# Patient Record
Sex: FEMALE | Race: BLACK OR AFRICAN AMERICAN
[De-identification: names, ages, dates, MRNs, and addresses within clinical notes are randomized per-mention and may not be internally consistent; named-entity substitution may affect disease eponyms.]

---

## 2018-04-24 ENCOUNTER — HOSPITAL ENCOUNTER (EMERGENCY)
Dept: HOSPITAL 63 - ER | Age: 38
LOS: 1 days | Discharge: HOME | End: 2018-04-25
Payer: SELF-PAY

## 2018-04-24 VITALS — BODY MASS INDEX: 45.99 KG/M2 | WEIGHT: 293 LBS | HEIGHT: 67 IN

## 2018-04-24 DIAGNOSIS — G43.909: Primary | ICD-10-CM

## 2018-04-24 DIAGNOSIS — M25.512: ICD-10-CM

## 2018-04-24 LAB
BASOPHILS # BLD AUTO: 0.1 X10^3/UL (ref 0–0.2)
BASOPHILS NFR BLD: 1 % (ref 0–3)
EOSINOPHIL NFR BLD: 0.4 X10^3/UL (ref 0–0.7)
EOSINOPHIL NFR BLD: 5 % (ref 0–3)
ERYTHROCYTE [DISTWIDTH] IN BLOOD BY AUTOMATED COUNT: 18.8 % (ref 11.5–14.5)
HCT VFR BLD CALC: 34.9 % (ref 36–47)
HGB BLD-MCNC: 11 G/DL (ref 12–15.5)
LYMPHOCYTES # BLD: 2.8 X10^3/UL (ref 1–4.8)
LYMPHOCYTES NFR BLD AUTO: 31 % (ref 24–48)
MCH RBC QN AUTO: 23 PG (ref 25–35)
MCHC RBC AUTO-ENTMCNC: 31 G/DL (ref 31–37)
MCV RBC AUTO: 73 FL (ref 79–100)
MONO #: 0.4 X10^3/UL (ref 0–1.1)
MONOCYTES NFR BLD: 5 % (ref 0–9)
NEUT #: 5.3 X10^3UL (ref 1.8–7.7)
NEUTROPHILS NFR BLD AUTO: 59 % (ref 31–73)
PLATELET # BLD AUTO: 253 X10^3/UL (ref 140–400)
RBC # BLD AUTO: 4.78 X10^6/UL (ref 3.5–5.4)
WBC # BLD AUTO: 9.1 X10^3/UL (ref 4–11)

## 2018-04-24 PROCEDURE — 96375 TX/PRO/DX INJ NEW DRUG ADDON: CPT

## 2018-04-24 PROCEDURE — 85025 COMPLETE CBC W/AUTO DIFF WBC: CPT

## 2018-04-24 PROCEDURE — 99285 EMERGENCY DEPT VISIT HI MDM: CPT

## 2018-04-24 PROCEDURE — 84484 ASSAY OF TROPONIN QUANT: CPT

## 2018-04-24 PROCEDURE — 93005 ELECTROCARDIOGRAM TRACING: CPT

## 2018-04-24 PROCEDURE — 36415 COLL VENOUS BLD VENIPUNCTURE: CPT

## 2018-04-24 PROCEDURE — 96374 THER/PROPH/DIAG INJ IV PUSH: CPT

## 2018-04-24 PROCEDURE — 80048 BASIC METABOLIC PNL TOTAL CA: CPT

## 2018-04-24 PROCEDURE — 71045 X-RAY EXAM CHEST 1 VIEW: CPT

## 2018-04-24 NOTE — PHYS DOC
Past History


Past Medical History:  Migraines


Past Surgical History:  Cholecystectomy


Alcohol Use:  Occasionally


Drug Use:  None





Adult General


Chief Complaint


Chief Complaint:  HEADACHE





HPI


HPI





Patient is a 38 year old F who presents with a migraine headache. Patient 

states she's had a migraine headache for the past day however tonight the pain 

got worse and she is having some sharp shooting pains down to her left 

shoulder. Patient denies any shortness of breath or chest pain. Patient states 

she's had no previous heart attack, stents, bypass surgery. Patient states she 

does not take any medications at this time. Patient does have a smoking 

history. Patient denies any history of DVTs or PEs. Patient denies any fevers. 

Patient states this headache came on gradual as her typical migraines stay. 

Patient has no other complaints.





Review of Systems


Review of Systems


GEN: Headache


HEENT: Denies blurred vision, sore throat


CV: Denies chest pain


RESP: Denies shortness of air, cough


GI: Denies n/v/d


NEURO: Denies confusion, dizziness


MSK: Left shoulder.





All other systems were reviewed and found to be within normal limits, except as 

documented in this note.





Allergies


Allergies





Allergies








Coded Allergies Type Severity Reaction Last Updated Verified


 


  No Known Drug Allergies    7/23/15 No











Physical Exam


Physical Exam


GEN.:    Mild distress.  Alert and oriented.


HEENT:    Head is normocephalic, atraumatic, pupils were equal and reactive to 

light, except ocular muscles are intact bilaterally


NECK:    Supple.  


LUNGS:    CTAB.


HEART:    RRR, S1, S2 present.  Peripheral pulses intact


ABDOMEN:    Soft, nontender.  Positive bowel sounds.


EXTREMITIES:    Without any cyanosis.    


NEUROLOGIC:     Normal speech, normal tone, cranial nerves II through XII are 

grossly intact without any focal neurological deficits


PSYCHIATRIC:    Normal affect, normal mood.


SKIN:   No ulcerations





Current Patient Data


Vital Signs





 Vital Signs








  Date Time  Temp Pulse Resp B/P (MAP) Pulse Ox O2 Delivery O2 Flow Rate FiO2


 


4/24/18 23:06 99.0 125 18  98 Room Air  











EKG


EKG


2332: EKG shows sinus tach rate of 110 no STEMI[]





Radiology/Procedures


Radiology/Procedures


Chest x-ray NAD[]





Course & Med Decision Making


Course & Med Decision Making


Pertinent Labs and Imaging studies reviewed. (See chart for details)





ED course:


Patient was seen and examined in the emergency room cardiac workup was ordered 

along with medications for her migraine headache which included, 2 g of 

magnesium, 10 mg of Decadron, 50 mg of Benadryl, 30 mg of Toradol, 1 L normal 

saline, 10 mg of Reglan


0051: I updated the patient on lab results, chest x-ray findings and 

reevaluated the patient in which she states she feels much better. Patient 

states her headache is improved since she got here and her left shoulder pain 

has improved. Patient's heart rates in the 90s now. Patient states she would 

like to go home. I recommended she follow up with a PCP for further evaluation 

and management.





MDM:


After reviewing the chart, CC/HPI/PMH, physical exam, [lab results], [

radiological results], I do not believe the patient is having acute MI (HEART=1)

, PE (Wells =0), and low suspicion for acute thoracic aortic dissection. I do 

not believe the patient has severe intracranial process warranting a CT scan or 

MRI. I believe the patient's migraine headache is consistent with her previous 

diagnosis of migraines. On reexamination patient's feeling much better and like 

to go home. I recommended short-term follow-up with her PCP next one to 2 days. 

Additional verbal discharge instructions were provided to the patient and that 

if symptoms get worse or any new symptoms arise that are worrisome to the 

patient she is to return to the emergency room immediately


 





[]





Dragon Disclaimer


Dragon Disclaimer


This electronic medical record was generated, in whole or in part, using a 

voice recognition dictation system.





Departure


Departure:


Impression:  


 Primary Impression:  


 Migraine headache


 Additional Impression:  


 Left shoulder pain


Disposition:  01 HOME, SELF-CARE


Condition:  IMPROVED


Referrals:  


SANDY ALARCON (PCP)


Patient Instructions:  Migraine Headache





Additional Instructions:  


Please follow-up with your family doctor in the next one to 2 days and return 

if symptoms increase





Problem Qualifiers











REMIGIO SOMERS DO Apr 24, 2018 23:28

## 2018-04-25 VITALS
DIASTOLIC BLOOD PRESSURE: 79 MMHG | SYSTOLIC BLOOD PRESSURE: 105 MMHG | SYSTOLIC BLOOD PRESSURE: 105 MMHG | DIASTOLIC BLOOD PRESSURE: 79 MMHG

## 2018-04-25 LAB
ANION GAP SERPL CALC-SCNC: 10 MMOL/L (ref 6–14)
ANISOCYTOSIS BLD QL SMEAR: SLIGHT
CA-I SERPL ISE-MCNC: 10 MG/DL (ref 7–20)
CALCIUM SERPL-MCNC: 8.7 MG/DL (ref 8.5–10.1)
CHLORIDE SERPL-SCNC: 105 MMOL/L (ref 98–107)
CO2 SERPL-SCNC: 25 MMOL/L (ref 21–32)
CREAT SERPL-MCNC: 0.8 MG/DL (ref 0.6–1)
DACRYOCYTES BLD QL SMEAR: (no result)
GFR SERPLBLD BASED ON 1.73 SQ M-ARVRAT: 97.1 ML/MIN
GLUCOSE SERPL-MCNC: 114 MG/DL (ref 70–99)
HYPOCHROMIA BLD QL SMEAR: SLIGHT
MICROCYTES BLD QL SMEAR: SLIGHT
OVALOCYTES BLD QL SMEAR: (no result)
PLATELET # BLD EST: ADEQUATE 10*3/UL
POLYCHROMASIA BLD QL SMEAR: SLIGHT
POTASSIUM SERPL-SCNC: 3.6 MMOL/L (ref 3.5–5.1)
SODIUM SERPL-SCNC: 140 MMOL/L (ref 136–145)

## 2018-04-25 NOTE — RAD
Portable chest, 4/24/2018:



History: Chest pain



Comparison is made to a study from 12/20/2015.  The heart size and pulmonary

vascularity are normal.  No pulmonary infiltrates are seen.  There is no

evidence of pleural fluid.



IMPRESSION: No acute cardiopulmonary abnormality is detected.

## 2018-10-09 ENCOUNTER — HOSPITAL ENCOUNTER (OUTPATIENT)
Dept: HOSPITAL 63 - MAMMO | Age: 38
Discharge: HOME | End: 2018-10-09
Payer: COMMERCIAL

## 2018-10-09 DIAGNOSIS — Z12.31: Primary | ICD-10-CM

## 2018-10-09 PROCEDURE — 77067 SCR MAMMO BI INCL CAD: CPT

## 2018-10-09 NOTE — RAD
DATE: 10/9/2018



EXAM: DIGITAL SCREEN BILAT W/CAD



HISTORY: Routine screening



COMPARISON: Baseline study



This study was interpreted with the benefit of Computerized Aided Detection

(CAD).





Breast Density: SCATTERED The breast parenchyma shows scattered fibroglandular

densities. Breast parenchyma level B.





FINDINGS: No suspicious breast densities seen.  There are scattered benign

type calcifications.  No suspicious microcalcifications are evident.





IMPRESSION: There is no mammographic evidence of malignancy in either breast.







BI-RADS CATEGORY: 2 BENIGN FINDING(S)



RECOMMENDED FOLLOW-UP: 12M 12 MONTH FOLLOW-UP



PQRS compliance statement: Patient information was entered into a reminder

system with a target due date     for the next mammogram.



Mammography is a sensitive method for finding small breast cancers, but it

does not detect them all and is not a substitute for careful clinical

examination.  A negative mammogram does not negate a clinically suspicious

finding and should not result in delay in biopsying a clinically suspicious

abnormality.



"Our facility is accredited by the [employer] Mammography

Program."

## 2020-07-03 ENCOUNTER — HOSPITAL ENCOUNTER (EMERGENCY)
Dept: HOSPITAL 63 - ER | Age: 40
Discharge: ANOTHER HOSPITAL | End: 2020-07-03
Payer: SELF-PAY

## 2020-07-03 VITALS
SYSTOLIC BLOOD PRESSURE: 142 MMHG | SYSTOLIC BLOOD PRESSURE: 142 MMHG | DIASTOLIC BLOOD PRESSURE: 95 MMHG | DIASTOLIC BLOOD PRESSURE: 95 MMHG | DIASTOLIC BLOOD PRESSURE: 95 MMHG | SYSTOLIC BLOOD PRESSURE: 142 MMHG

## 2020-07-03 VITALS — HEIGHT: 67 IN | BODY MASS INDEX: 45.99 KG/M2 | WEIGHT: 293 LBS

## 2020-07-03 DIAGNOSIS — K63.1: ICD-10-CM

## 2020-07-03 DIAGNOSIS — G43.909: ICD-10-CM

## 2020-07-03 DIAGNOSIS — I10: ICD-10-CM

## 2020-07-03 DIAGNOSIS — K57.92: Primary | ICD-10-CM

## 2020-07-03 DIAGNOSIS — E11.9: ICD-10-CM

## 2020-07-03 DIAGNOSIS — Z87.891: ICD-10-CM

## 2020-07-03 DIAGNOSIS — D72.829: ICD-10-CM

## 2020-07-03 DIAGNOSIS — E66.9: ICD-10-CM

## 2020-07-03 DIAGNOSIS — Z98.890: ICD-10-CM

## 2020-07-03 DIAGNOSIS — Z90.49: ICD-10-CM

## 2020-07-03 LAB
% LYMPHS: 5 % (ref 24–48)
% MONOS: 5 % (ref 0–10)
% SEGS: 89 % (ref 35–66)
ALBUMIN SERPL-MCNC: 2.7 G/DL (ref 3.4–5)
ALBUMIN/GLOB SERPL: 0.5 {RATIO} (ref 1–1.7)
ALP SERPL-CCNC: 95 U/L (ref 46–116)
ALT SERPL-CCNC: 24 U/L (ref 14–59)
ANION GAP SERPL CALC-SCNC: 11 MMOL/L (ref 6–14)
APTT PPP: (no result) S
AST SERPL-CCNC: 23 U/L (ref 15–37)
BACTERIA #/AREA URNS HPF: (no result) /HPF
BASOPHILS # BLD AUTO: 0.1 X10^3/UL (ref 0–0.2)
BASOPHILS NFR BLD: 1 % (ref 0–3)
BILIRUB SERPL-MCNC: 0.8 MG/DL (ref 0.2–1)
BILIRUB UR QL STRIP: (no result)
BUN/CREAT SERPL: 10 (ref 6–20)
CA-I SERPL ISE-MCNC: 10 MG/DL (ref 7–20)
CALCIUM SERPL-MCNC: 9.1 MG/DL (ref 8.5–10.1)
CHLORIDE SERPL-SCNC: 99 MMOL/L (ref 98–107)
CO2 SERPL-SCNC: 26 MMOL/L (ref 21–32)
CREAT SERPL-MCNC: 1 MG/DL (ref 0.6–1)
EOSINOPHIL NFR BLD AUTO: 1 % (ref 0–5)
EOSINOPHIL NFR BLD: 0 % (ref 0–3)
EOSINOPHIL NFR BLD: 0.1 X10^3/UL (ref 0–0.7)
ERYTHROCYTE [DISTWIDTH] IN BLOOD BY AUTOMATED COUNT: 14.8 % (ref 11.5–14.5)
FIBRINOGEN PPP-MCNC: (no result) MG/DL
GFR SERPLBLD BASED ON 1.73 SQ M-ARVRAT: 74.3 ML/MIN
GLOBULIN SER-MCNC: 5.5 G/DL (ref 2.2–3.8)
GLUCOSE SERPL-MCNC: 116 MG/DL (ref 70–99)
GLUCOSE UR STRIP-MCNC: (no result) MG/DL
HCT VFR BLD CALC: 38.9 % (ref 36–47)
HGB BLD-MCNC: 12.5 G/DL (ref 12–15.5)
LIPASE: 45 U/L (ref 73–393)
LYMPHOCYTES # BLD: 1.8 X10^3/UL (ref 1–4.8)
LYMPHOCYTES NFR BLD AUTO: 11 % (ref 24–48)
MCH RBC QN AUTO: 27 PG (ref 25–35)
MCHC RBC AUTO-ENTMCNC: 32 G/DL (ref 31–37)
MCV RBC AUTO: 82 FL (ref 79–100)
MONO #: 0.6 X10^3/UL (ref 0–1.1)
MONOCYTES NFR BLD: 4 % (ref 0–9)
NEUT #: 13.2 X10^3UL (ref 1.8–7.7)
NEUTROPHILS NFR BLD AUTO: 84 % (ref 31–73)
NITRITE UR QL STRIP: (no result)
PLATELET # BLD AUTO: 254 X10^3/UL (ref 140–400)
PLATELET # BLD EST: ADEQUATE 10*3/UL
POTASSIUM SERPL-SCNC: 3.5 MMOL/L (ref 3.5–5.1)
PREG TEST PT QUAL: NEGATIVE
PROT SERPL-MCNC: 8.2 G/DL (ref 6.4–8.2)
RBC # BLD AUTO: 4.73 X10^6/UL (ref 3.5–5.4)
RBC #/AREA URNS HPF: (no result) /HPF (ref 0–2)
SODIUM SERPL-SCNC: 136 MMOL/L (ref 136–145)
SP GR UR STRIP: (no result)
SQUAMOUS #/AREA URNS LPF: (no result) /LPF
UROBILINOGEN UR-MCNC: (no result) MG/DL
WBC # BLD AUTO: 15.8 X10^3/UL (ref 4–11)
WBC #/AREA URNS HPF: (no result) /HPF (ref 0–4)

## 2020-07-03 PROCEDURE — 81001 URINALYSIS AUTO W/SCOPE: CPT

## 2020-07-03 PROCEDURE — 86901 BLOOD TYPING SEROLOGIC RH(D): CPT

## 2020-07-03 PROCEDURE — 96375 TX/PRO/DX INJ NEW DRUG ADDON: CPT

## 2020-07-03 PROCEDURE — 96365 THER/PROPH/DIAG IV INF INIT: CPT

## 2020-07-03 PROCEDURE — 74177 CT ABD & PELVIS W/CONTRAST: CPT

## 2020-07-03 PROCEDURE — 85007 BL SMEAR W/DIFF WBC COUNT: CPT

## 2020-07-03 PROCEDURE — 36415 COLL VENOUS BLD VENIPUNCTURE: CPT

## 2020-07-03 PROCEDURE — 86850 RBC ANTIBODY SCREEN: CPT

## 2020-07-03 PROCEDURE — 99285 EMERGENCY DEPT VISIT HI MDM: CPT

## 2020-07-03 PROCEDURE — 84703 CHORIONIC GONADOTROPIN ASSAY: CPT

## 2020-07-03 PROCEDURE — 86900 BLOOD TYPING SEROLOGIC ABO: CPT

## 2020-07-03 PROCEDURE — 85025 COMPLETE CBC W/AUTO DIFF WBC: CPT

## 2020-07-03 PROCEDURE — 83690 ASSAY OF LIPASE: CPT

## 2020-07-03 PROCEDURE — 80053 COMPREHEN METABOLIC PANEL: CPT

## 2020-07-03 NOTE — PHYS DOC
Past History


Past Medical History:  Diabetes, Hypertension, Migraines


Past Medical History


obesity


Past Surgical History:  Cholecystectomy, 


Smoking:  Quit Greater Than 1 Year


Alcohol Use:  Occasionally


Drug Use:  None





General Adult


EDM:


Chief Complaint:  ABDOMINAL PAIN





HPI:


HPI:





Patient is a 40 year old female who presents for evaluation of mid and upper 

abdominal discomfort.  Patient has been having dark and bloody stools over the 

past 2 days.  Patient states that the blood is present when she wipes after a 

bowel movement.  She states she is dizzy when she tries to stand or walk.  

Patient denies any urinary or vaginal complaints.  Patient is not on a blood thi

nner.  Patient denies other physical complaints





Review of Systems:


Review of Systems:


Constitutional:  Denies fever or chills 


Eyes:  Denies change in visual acuity 


HENT:  Denies nasal congestion or sore throat 


Respiratory:  Denies cough or shortness of breath 


Cardiovascular:  Denies chest pain or edema 


GI:  Mid and upper abdominal pain with nausea, no vomiting, some bloody stools 

no diarrhea 


: Denies dysuria 


Musculoskeletal:  Denies back pain or joint pain 


Integument:  Denies rash 


Neurologic:  Denies headache, focal weakness or sensory changes 


Endocrine:  Denies polyuria or polydipsia 


Lymphatic:  Denies swollen glands 


Psychiatric:  Denies depression or anxiety





Heart Score:


Risk Factors:


Risk Factors:  DM, Current or recent (<one month) smoker, HTN, HLP, family 

history of CAD, obesity.


Risk Scores:


Score 0 - 3:  2.5% MACE over next 6 weeks - Discharge Home


Score 4 - 6:  20.3% MACE over next 6 weeks - Admit for Clinical Observation


Score 7 - 10:  72.7% MACE over next 6 weeks - Early Invasive Strategies





Current Medications:


Current Meds:





Current Medications








 Medications


  (Trade)  Dose


 Ordered  Sig/Loki  Start Time


 Stop Time Status Last Admin


Dose Admin


 


 Famotidine


  (Pepcid Vial)  20 mg  1X  ONCE  7/3/20 11:15


 7/3/20 11:16 UNV  





 


 Ondansetron HCl


  (Zofran)  4 mg  1X  ONCE  7/3/20 11:15


 7/3/20 11:16 UNV  














Allergies:


Allergies:





Allergies








Coded Allergies Type Severity Reaction Last Updated Verified


 


  No Known Drug Allergies    7/23/15 No











Physical Exam:


PE:





Constitutional: Well developed, well nourished, mild acute distress, non-toxic 

appearance. []


HENT: Normocephalic, atraumatic, bilateral external ears normal, oropharynx 

moist, no oral exudates, nose normal. []


Eyes: PERRL, EOMI, conjunctiva normal, no discharge. [] 


Neck: Normal range of motion, no tenderness, supple. [] 


Cardiovascular:Heart rate regular rhythm, no murmur []


Lungs & Thorax:  Bilateral breath sounds clear to auscultation []


Abdomen: Bowel sounds diminished, soft, mild mid abdomen tenderness, no masses, 

no guarding or rebound. [] 


Skin: Warm, dry, no erythema, no rash. [] 


Back: No tenderness. [] 


Extremities: No tenderness, no cyanosis, ROM intact, no edema. [] 


Neurologic: Alert and oriented, normal motor function, normal sensory function, 

no focal deficits noted. []


Psychologic: Affect normal, judgement normal, mood normal. []





Current Patient Data:


Labs:


UA: bloody results








Laboratory Tests








Test


 7/3/20


11:20


 


White Blood Count 15.8 x10^3/uL 


 


Red Blood Count 4.73 x10^6/uL 


 


Hemoglobin 12.5 g/dL 


 


Hematocrit 38.9 % 


 


Mean Corpuscular Volume 82 fL 


 


Mean Corpuscular Hemoglobin 27 pg 


 


Mean Corpuscular Hemoglobin


Concent 32 g/dL 





 


Red Cell Distribution Width 14.8 % 


 


Platelet Count 254 x10^3/uL 


 


Neutrophils (%) (Auto) 84 % 


 


Lymphocytes (%) (Auto) 11 % 


 


Monocytes (%) (Auto) 4 % 


 


Eosinophils (%) (Auto) 0 % 


 


Basophils (%) (Auto) 1 % 


 


Neutrophils # (Auto) 13.2 x10^3uL 


 


Lymphocytes # (Auto) 1.8 x10^3/uL 


 


Monocytes # (Auto) 0.6 x10^3/uL 


 


Eosinophils # (Auto) 0.1 x10^3/uL 


 


Basophils # (Auto) 0.1 x10^3/uL 


 


Segmented Neutrophils % 89 % 


 


Lymphocytes % 5 % 


 


Monocytes % 5 % 


 


Eosinophils % 1 % 


 


Platelet Estimate Adequate 


 


Large Platelets Occ 


 


Sodium Level 136 mmol/L 


 


Potassium Level 3.5 mmol/L 


 


Chloride Level 99 mmol/L 


 


Carbon Dioxide Level 26 mmol/L 


 


Anion Gap 11 


 


Blood Urea Nitrogen 10 mg/dL 


 


Creatinine 1.0 mg/dL 


 


Estimated GFR


(Cockcroft-Gault) 74.3 





 


BUN/Creatinine Ratio 10 


 


Glucose Level 116 mg/dL 


 


Calcium Level 9.1 mg/dL 


 


Total Bilirubin 0.8 mg/dL 


 


Aspartate Amino Transf


(AST/SGOT) 23 U/L 





 


Alanine Aminotransferase


(ALT/SGPT) 24 U/L 





 


Alkaline Phosphatase 95 U/L 


 


Total Protein 8.2 g/dL 


 


Albumin 2.7 g/dL 


 


Albumin/Globulin Ratio 0.5 


 


Lipase 45 U/L 


 


Serum Pregnancy Test,


Qualitative Negative 











Current Medications








 Medications


  (Trade)  Dose


 Ordered  Sig/Loki


 Route


 PRN Reason  Start Time


 Stop Time Status Last Admin


Dose Admin


 


 Ondansetron HCl


  (Zofran)  4 mg  1X  ONCE


 IVP


   7/3/20 11:30


 7/3/20 11:31 DC 7/3/20 11:46





 


 Famotidine


  (Pepcid Vial)  20 mg  1X  ONCE


 IVP


   7/3/20 11:15


 7/3/20 11:24 DC 7/3/20 11:46





 


 Iohexol


  (Omnipaque 240


 Mg/ml)  50 ml  STK-MED ONCE


 .ROUTE


   7/3/20 11:54


 7/3/20 11:54 DC  





 


 Iohexol


  (Omnipaque 240


 Mg/ml)  30 ml  1X  ONCE


 PO


   7/3/20 12:15


 7/3/20 12:16 DC  





 


 Iohexol


  (Omnipaque 300


 Mg/ml)  75 ml  1X  ONCE


 IV


   7/3/20 12:15


 7/3/20 12:16 DC  





 


 Fentanyl Citrate


  (Fentanyl 2ml


 Vial)  50 mcg  1X  ONCE


 IVP


   7/3/20 12:30


 7/3/20 12:31   











Vital Signs:





                                   Vital Signs








  Date Time  Temp Pulse Resp B/P (MAP) Pulse Ox O2 Delivery O2 Flow Rate FiO2


 


7/3/20 11:20 98.1 128 16 125/74 (91) 93 Room Air  











EKG:


EKG:


[]





Radiology/Procedures:


Radiology/Procedures:


[facility] [address], [place] [address] [phone]


                                        


                                 IMAGING REPORT





                                     Signed





PATIENT: SHEREE JONES  ACCOUNT: [id number]     MRN#: [id number]


: 1980           LOCATION: ER              AGE: 40


SEX: F                    EXAM DT: 20         ACCESSION#: [id number]


STATUS: REG ER            ORD. PHYSICIAN: JOSE MONTOYA DO


REASON: abd pain, dark stools


PROCEDURE: CT ABD PELV W/ORAL&IV CONTRAST





 


CT SCAN OF THE ABDOMEN AND PELVIS WITH IV CONTRAST.


 


History:  Reason: abd pain, dark stools / Spl. Instructions: DRINKING 12-1


/ History: 


 


Comparison:None.


 


Procedure: 


Contiguous axial images of the abdomen and pelvis were performed  after 


the administration of 75 cc of Isovue 370 IV contrast.


 


Oral contrast: Yes.


 


Findings:


 


Linear opacities in right lung base are likely discoid atelectasis.


 


The appendix is normal.


 


There is an IUD in the uterus which appears well seated. There is moderate


wall thickening of a 12 cm length of the proximal and mid sigmoid colon 


with surrounding inflammation and diverticula with multiple foci of air 


projecting outside of the lumen but no walled off abscess.


 


Liver: Unremarkable


Spleen: Unremarkable


Pancreas: Unremarkable


Adrenal Glands: Unremarkable


Kidneys: Unremarkable


 


There is no mass or lymphadenopathy.


 


There is no free air.  


 


There is no free fluid.


 


The urinary bladder is mostly collapsed.


 


Impression:


 


Diverticulitis with a walled off microperforation. There is no free fluid 


or free air or abscess.


 


End impression


 


PQRS Compliance Statement:


 


One or more of the following individualized dose reduction techniques were


utilized for this examination:  


1. Automated exposure control  


2. Adjustment of the mA and/or kV according to patient size  


3. Use of iterative reconstruction technique


 


Electronically signed by: Noeim Thompson III, MD (7/3/2020 1:56 PM) [id number]














DICTATED AND SIGNED BY:     NOEMI THOMPSON III, MD


DATE:     20 1352





CC: JOSE MONTOYA DO; DB MALIK MD ~


[]





Course & Med Decision Making:


Course & Med Decision Making


Pertinent Labs and Imaging studies reviewed. (See chart for details)





[]





Dragon Disclaimer:


Dragon Disclaimer:


This electronic medical record was generated, in whole or in part, using a voice

 recognition dictation system.





1405 stable, pain is controlled at this time.  CT scan reveals the patient has a

 microperforation along with her diverticulitis.  Hospitalist service patient 

discussed case.  Patient is willing to go to [facility].  Patient

 will likely need transfer because she needs access to a general surgeon for 

consult.  Dose of IV Zosyn ordered since patient has leukocytosis and active 

diverticulitis.  Patient will remain n.p.o. and was assured she cannot have 

anything to eat or drink at this time





1435  Dr. Quinn paged again to discuss case.  I tried to direct call his mobile 

phone but went right to voice mail and his mailbox was full.  He called right 

back and accepted pt to [facility]





Departure


Departure:


Impression:  


   Primary Impression:  


   Diverticulitis


   Additional Impressions:  


   Perforated bowel


   Leukocytosis


   Qualified Codes:  D72.829 - Elevated white blood cell count, unspecified


Disposition:  05 TRANSFER OTHER (Dr. Ball is the accepting hospitalist to 

[facility])


Condition:  STABLE


Referrals:  


DB MALIK MD (PCP)





Justification of Admission:


Justification of Admission:


Justification of Admission Dx:  Yes


Comments:


diverticulitis with micro perforation











JOSE MONTOYA DO               Jul 3, 2020 11:25

## 2020-07-03 NOTE — RAD
CT SCAN OF THE ABDOMEN AND PELVIS WITH IV CONTRAST.

 

History:  Reason: abd pain, dark stools / Spl. Instructions: DRINKING 12-1

/ History: 

 

Comparison:None.

 

Procedure: 

Contiguous axial images of the abdomen and pelvis were performed  after 

the administration of 75 cc of Isovue 370 IV contrast.

 

Oral contrast: Yes.

 

Findings:

 

Linear opacities in right lung base are likely discoid atelectasis.

 

The appendix is normal.

 

There is an IUD in the uterus which appears well seated. There is moderate

wall thickening of a 12 cm length of the proximal and mid sigmoid colon 

with surrounding inflammation and diverticula with multiple foci of air 

projecting outside of the lumen but no walled off abscess.

 

Liver: Unremarkable

Spleen: Unremarkable

Pancreas: Unremarkable

Adrenal Glands: Unremarkable

Kidneys: Unremarkable

 

There is no mass or lymphadenopathy.

 

There is no free air.  

 

There is no free fluid.

 

The urinary bladder is mostly collapsed.

 

Impression:

 

Diverticulitis with a walled off microperforation. There is no free fluid 

or free air or abscess.

 

End impression

 

PQRS Compliance Statement:

 

One or more of the following individualized dose reduction techniques were

utilized for this examination:  

1. Automated exposure control  

2. Adjustment of the mA and/or kV according to patient size  

3. Use of iterative reconstruction technique

 

Electronically signed by: Tucker Arias III, MD (7/3/2020 1:56 PM) [id number]

## 2021-11-05 ENCOUNTER — HOSPITAL ENCOUNTER (OUTPATIENT)
Dept: HOSPITAL 63 - RAD | Age: 41
End: 2021-11-05
Attending: FAMILY MEDICINE
Payer: MEDICAID
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
## 2021-11-05 DIAGNOSIS — S89.92XA: Primary | ICD-10-CM
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
## 2021-11-05 DIAGNOSIS — M25.762: ICD-10-CM

## 2021-11-05 DIAGNOSIS — M25.562: ICD-10-CM
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
## 2021-11-05 PROCEDURE — 73562 X-RAY EXAM OF KNEE 3: CPT

## 2021-11-05 NOTE — RAD
Exam Date:  11/5/2021 8:47 AM



XR KNEE _3 VIEWS_LT



Indication: Reason: KNEE INJURY X 2 WEEKS AGO, PAINFUL TO WALK / Spl. Instructions:  / History: .



FINDINGS/

IMPRESSION:  





No acute fracture or dislocation.  There is mild medial compartment joint space narrowing.  Small ost
eophytes are noted.  The soft tissues are within normal limits.  



Electronically signed by: Raffi Weinstein MD (11/5/2021 9:21 AM) [id number]

## 2022-03-15 ENCOUNTER — HOSPITAL ENCOUNTER (OUTPATIENT)
Dept: HOSPITAL 63 - US | Age: 42
End: 2022-03-15
Attending: FAMILY MEDICINE
Payer: MEDICAID

## 2022-03-15 DIAGNOSIS — L02.91: Primary | ICD-10-CM

## 2022-03-15 PROCEDURE — 76882 US LMTD JT/FCL EVL NVASC XTR: CPT

## 2022-03-15 NOTE — RAD
EXAM: Back sonogram.



HISTORY: Abscess.



TECHNIQUE: Sonographic imaging of the midline lower back at the site of concern was performed.



COMPARISON: None.



FINDINGS: There is a complicated nonvascular fluid collection within the superficial soft tissues of 
the midline back at the site of palpable concern measuring 1.2 x 0.8 x 0.6 cm and extending to the sk
in surface via a small tract.



IMPRESSION: 1.2 cm suspected subcutaneous abscess or sebaceous cyst with superimposed infection commu
nicated to the skin surface within the midline back at the site of palpable concern.



Electronically signed by: Anne Shea MD (3/15/2022 11:47 AM) [id number]
